# Patient Record
Sex: MALE | Race: BLACK OR AFRICAN AMERICAN | HISPANIC OR LATINO | URBAN - METROPOLITAN AREA
[De-identification: names, ages, dates, MRNs, and addresses within clinical notes are randomized per-mention and may not be internally consistent; named-entity substitution may affect disease eponyms.]

---

## 2019-11-19 ENCOUNTER — EMERGENCY (EMERGENCY)
Facility: HOSPITAL | Age: 24
LOS: 1 days | Discharge: HOME | End: 2019-11-19
Attending: EMERGENCY MEDICINE
Payer: COMMERCIAL

## 2019-11-19 VITALS
OXYGEN SATURATION: 100 % | SYSTOLIC BLOOD PRESSURE: 115 MMHG | DIASTOLIC BLOOD PRESSURE: 77 MMHG | TEMPERATURE: 97 F | HEART RATE: 85 BPM | RESPIRATION RATE: 16 BRPM

## 2019-11-19 VITALS
HEIGHT: 67 IN | TEMPERATURE: 98 F | RESPIRATION RATE: 18 BRPM | OXYGEN SATURATION: 99 % | DIASTOLIC BLOOD PRESSURE: 76 MMHG | SYSTOLIC BLOOD PRESSURE: 161 MMHG | HEART RATE: 75 BPM | WEIGHT: 110.01 LBS

## 2019-11-19 PROCEDURE — 99284 EMERGENCY DEPT VISIT MOD MDM: CPT

## 2019-11-19 PROCEDURE — 99283 EMERGENCY DEPT VISIT LOW MDM: CPT

## 2019-11-19 RX ORDER — IBUPROFEN 200 MG
600 TABLET ORAL ONCE
Refills: 0 | Status: COMPLETED | OUTPATIENT
Start: 2019-11-19 | End: 2019-11-19

## 2019-11-19 RX ORDER — CEPHALEXIN 500 MG
1 CAPSULE ORAL
Qty: 28 | Refills: 0
Start: 2019-11-19 | End: 2019-11-25

## 2019-11-19 RX ORDER — CEPHALEXIN 500 MG
500 CAPSULE ORAL ONCE
Refills: 0 | Status: COMPLETED | OUTPATIENT
Start: 2019-11-19 | End: 2019-11-19

## 2019-11-19 RX ORDER — IBUPROFEN 200 MG
1 TABLET ORAL
Qty: 20 | Refills: 0
Start: 2019-11-19 | End: 2019-11-23

## 2019-11-19 RX ADMIN — Medication 500 MILLIGRAM(S): at 13:04

## 2019-11-19 RX ADMIN — Medication 600 MILLIGRAM(S): at 13:04

## 2019-11-19 NOTE — ED PROVIDER NOTE - ATTENDING CONTRIBUTION TO CARE
Sarmiento: pt with lump on right arm.  appears superficial cellulitis.  indurated and ttp.  no crepitus, no pain distal    a/p: superficial cellulitis.  will give abx and monitor sx.

## 2019-11-19 NOTE — ED PROVIDER NOTE - CLINICAL SUMMARY MEDICAL DECISION MAKING FREE TEXT BOX
23 y/o M with a small pimple and surrounding cellulitis. No evidence of abscess, Rx keflex and strict return precautions. 25 y/o M with a small pimple and surrounding cellulitis. No evidence of abscess, Rx keflex and strict return precautions. PMD follow up in 2-3 days.

## 2019-11-19 NOTE — ED ADULT NURSE NOTE - OBJECTIVE STATEMENT
States he has a bump to right forearm , started like a pimple since yesterday , got worse , tried to poke area x2 unsuccessful . Right mid forearm with area of 0.5cm pustule and surrounding erythema 4cm x3 cm with mild induration

## 2019-11-19 NOTE — ED PROVIDER NOTE - OBJECTIVE STATEMENT
23 y/o M patient w/ no significant PMHx and no significant PSHx presents to the ED with right arm pain since yesterday. Patient reports a gradual onset of mid forearm pustule with pain and swelling with a burning sensation. Patient states he tried to poke at it twice with no success. Patient denies fever, chills, and any other complaints. Patient recent travel to [facility] areas or [facility], [facility], or any injuries. NKDA. 25 y/o M patient w/ no significant PMHx and no significant PSHx presents to the ED with right arm pain since yesterday. Patient reports a gradual onset of mid forearm pimple with pain and swelling with a burning sensation. Patient states he tried to poke at it twice with no success. Patient denies fever, chills, and any other complaints. Patient recent travel to [facility] areas or [facility], [facility], or any injuries. NKDA.

## 2019-11-19 NOTE — ED ADULT TRIAGE NOTE - ESI TRIAGE ACUITY LEVEL, MLM
Dr. Roland called and gave TORB to discontinue ibuprofen and order Toradol IV 30 mg q8 start now,  Order implemented and will continue to monitor   4

## 2020-04-09 ENCOUNTER — EMERGENCY (EMERGENCY)
Facility: HOSPITAL | Age: 25
LOS: 1 days | Discharge: HOME | End: 2020-04-09
Payer: COMMERCIAL

## 2020-04-09 VITALS
WEIGHT: 151.02 LBS | OXYGEN SATURATION: 99 % | HEART RATE: 76 BPM | SYSTOLIC BLOOD PRESSURE: 129 MMHG | HEIGHT: 67 IN | TEMPERATURE: 98 F | DIASTOLIC BLOOD PRESSURE: 82 MMHG | RESPIRATION RATE: 18 BRPM

## 2020-04-09 PROCEDURE — 87635 SARS-COV-2 COVID-19 AMP PRB: CPT

## 2020-04-09 PROCEDURE — 94640 AIRWAY INHALATION TREATMENT: CPT

## 2020-04-09 PROCEDURE — 99283 EMERGENCY DEPT VISIT LOW MDM: CPT

## 2020-04-09 RX ORDER — ALBUTEROL 90 UG/1
1 AEROSOL, METERED ORAL ONCE
Refills: 0 | Status: COMPLETED | OUTPATIENT
Start: 2020-04-09 | End: 2020-04-09

## 2020-04-09 RX ADMIN — ALBUTEROL 1 PUFF(S): 90 AEROSOL, METERED ORAL at 11:53

## 2020-04-09 NOTE — ED PROVIDER NOTE - PROGRESS NOTE DETAILS
Will accept text results, Pt is well appearing walking with steady gait, stable for discharge and follow up without fail with medical doctor. I had a detailed discussion with the patient and/or guardian regarding the historical points, exam findings, and any diagnostic results supporting the discharge diagnosis. Pt educated on care and need for follow up. Strict return instructions and red flag signs and symptoms discussed with patient. Questions answered. Pt shows understanding of discharge information and agrees to follow.

## 2020-04-09 NOTE — ED PROVIDER NOTE - CLINICAL SUMMARY MEDICAL DECISION MAKING FREE TEXT BOX
Based on exam and history will swab for corona virus, advise to continue using MDI as needed, and Tylenol for fever.

## 2020-04-09 NOTE — ED PROVIDER NOTE - NSFOLLOWUPINSTRUCTIONS_ED_ALL_ED_FT
You likely have Coronavirus.    COVID-19 testing are currently being prioritized at [facility] for admitted patients.     All patients that are stable are being discharged from the ED, even if there is a concern for coronavirus. Since you are stable, you are being discharged. Your test results may take 5-7 days. You will get a text message or email with results. Please check the patient online portal for results. Please follow the instructions on provided coronavirus discharge educational forms and self quarantine for 14 days.     In addition, you have been placed on our surveillance tracker. Return to the ED immediately if you have shortness of breath, fever, pain, weakness, vomiting any concerns.    1. STAY HOME for 14 DAYS  2. Minimize Human contact to ONLY ESSENTIAL  3. Every time you wash your hands, sing the HAPPY BIRTHDAY Song so you know you're washing long enough.  Make sure to scrub the webspace between your fingers.  4. DRINK 1-3 Liters of fluids day x at least 5 days.  To remain hydrated. Your fatigue, lightheadedness, and body aches will decrease and your fever has a better chance of breaking if you are well hydrated.    5. For your Fever and Body aches takes Tylenol 650-100mg every 4-6h (max 4000mg/day). Try not to use ibuprofen, aspirin or naproxen (Advil, Motrin or Aleve) as these may worsen Coronavirus infection.  6. Use an inhaler for mild shortness of breath and cough  7. Take a double or triple dose of Vitamin C (7200-8263 mg) per day spread out over the day .  8. RETURN TO THE ER IMMEDIATELY IF YOU HAVE WORSENING SHORTNESS OF BREATH. SYMPTOMS USUALLY PEAK BETWEEN DAY 7-10.

## 2020-04-09 NOTE — ED PROVIDER NOTE - OBJECTIVE STATEMENT
26 yo male with PMHx of asthma, working at [employer], presenting to ED with complaints of SOB, cough, and fever while at work the last 3 days. Patient fears he was exposed to Corona Virus.

## 2020-04-09 NOTE — ED PROVIDER NOTE - PATIENT PORTAL LINK FT
You can access the FollowMyHealth Patient Portal offered by [facility] by registering at the following website: [email or web address]. By joining [employer]’s FollowMyHealth portal, you will also be able to view your health information using other applications (apps) compatible with our system.

## 2020-04-10 LAB — SARS-COV-2 RNA SPEC QL NAA+PROBE: DETECTED

## 2020-06-23 NOTE — ED PROVIDER NOTE - PATIENT PORTAL LINK FT
You can access the FollowMyHealth Patient Portal offered by [facility] by registering at the following website: [email or web address]. By joining [employer]’s FollowMyHealth portal, you will also be able to view your health information using other applications (apps) compatible with our system. Debridement Text: The wound edges were debrided prior to proceeding with the closure to facilitate wound healing.

## 2020-11-30 ENCOUNTER — EMERGENCY (EMERGENCY)
Facility: HOSPITAL | Age: 25
LOS: 1 days | Discharge: HOME | End: 2020-11-30
Admitting: EMERGENCY MEDICINE
Payer: COMMERCIAL

## 2020-11-30 ENCOUNTER — NON-APPOINTMENT (OUTPATIENT)
Age: 25
End: 2020-11-30

## 2020-11-30 ENCOUNTER — APPOINTMENT (OUTPATIENT)
Dept: OPHTHALMOLOGY | Facility: CLINIC | Age: 25
End: 2020-11-30
Payer: COMMERCIAL

## 2020-11-30 VITALS
SYSTOLIC BLOOD PRESSURE: 132 MMHG | TEMPERATURE: 98 F | RESPIRATION RATE: 16 BRPM | OXYGEN SATURATION: 98 % | DIASTOLIC BLOOD PRESSURE: 82 MMHG | HEIGHT: 67 IN | HEART RATE: 65 BPM | WEIGHT: 115.08 LBS
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
## 2020-11-30 DIAGNOSIS — H57.12 OCULAR PAIN, LEFT EYE: ICD-10-CM
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
## 2020-11-30 DIAGNOSIS — S05.12XA CONTUSION OF EYEBALL AND ORBITAL TISSUES, LEFT EYE, INITIAL ENCOUNTER: ICD-10-CM

## 2020-11-30 PROBLEM — Z00.00 ENCOUNTER FOR PREVENTIVE HEALTH EXAMINATION: Status: ACTIVE | Noted: 2020-11-30

## 2020-11-30 PROBLEM — J45.20 MILD INTERMITTENT ASTHMA, UNCOMPLICATED: Chronic | Status: ACTIVE | Noted: 2020-04-09

## 2020-11-30 PROCEDURE — 99284 EMERGENCY DEPT VISIT MOD MDM: CPT

## 2020-11-30 PROCEDURE — 92004 COMPRE OPH EXAM NEW PT 1/>: CPT

## 2020-11-30 PROCEDURE — 99072 ADDL SUPL MATRL&STAF TM PHE: CPT

## 2020-11-30 PROCEDURE — 70480 CT ORBIT/EAR/FOSSA W/O DYE: CPT | Mod: 26

## 2020-11-30 PROCEDURE — 99284 EMERGENCY DEPT VISIT MOD MDM: CPT | Mod: 25

## 2020-11-30 PROCEDURE — 70480 CT ORBIT/EAR/FOSSA W/O DYE: CPT

## 2020-11-30 NOTE — ED PROVIDER NOTE - PATIENT PORTAL LINK FT
You can access the FollowMyHealth Patient Portal offered by [facility] by registering at the following website: [email or web address]. By joining [employer]’s FollowMyHealth portal, you will also be able to view your health information using other applications (apps) compatible with our system.

## 2020-11-30 NOTE — ED PROVIDER NOTE - OBJECTIVE STATEMENT
states that on Saturday he was hit on the nose/eye with a football. states that after the injury he was experiencing flashing light in the left eye until Sunday afternoon. states that he is not experiencing flashing lights at this time. states that he has new floaters in the left eye and "2/5 of my vision isn't there". does not endorse head injury numbness tingling weakness, no change in hearing. states pain to the side of his left nose. was seen and evaluated by urgent care and sent to ED for further evaluation.

## 2020-11-30 NOTE — ED PROVIDER NOTE - NSFOLLOWUPINSTRUCTIONS_ED_ALL_ED_FT
please go straight to [facility] for appointment at 2pm with Dr. Huerta (6th floor).            BLURRED VISION - AfterCare(R) Instructions(ER/ED)           Blurred Vision    WHAT YOU NEED TO KNOW:    Blurred vision is when you cannot see fine details. You may have blurred vision if you are nearsighted or farsighted and you need glasses. Blurred vision may be caused by a corneal abrasion (scratch on the cornea) or a corneal ulcer (open sore). You may have blurred vision if your eye came into contact with a chemical. A foreign body or infection may also cause blurred vision. Medical conditions, such as cataracts, glaucoma, detached retina, and nerve disorders can also cause blurred vision. Blurred vision may also be caused by a concussion or a tumor. If you have diabetes, you may develop diabetic retinopathy. Diabetic retinopathy damages the blood vessels of your retina.     Eye Anatomy         DISCHARGE INSTRUCTIONS:    Return to the emergency department if:   •You have weakness in an arm or leg, difficulty speaking or seeing, and a severe headache.      •You have a fever, eye pain, or discharge.       •You have a sudden loss of vision.       Contact your healthcare provider if:   •Your blurred vision gets worse.      •Your blurred vision is worse in the morning.      •You have a sudden headache or eye pain.      •Your eye has swelling, redness, or discharge.      •You see floaters, flashes of light, fine dots, or cobweb shapes.      •You have questions or concerns about your condition or care.       Medicines: You may need any of the following:   •Prescription pain medicine may be given. Ask how to take this medicine safely.      •Antibiotics help prevent or treat an eye infection caused by bacteria. It may be given as eyedrops or an ointment.      •Take your medicine as directed. Contact your healthcare provider if you think your medicine is not helping or if you have side effects. Tell him of her if you are allergic to any medicine. Keep a list of the medicines, vitamins, and herbs you take. Include the amounts, and when and why you take them. Bring the list or the pill bottles to follow-up visits. Carry your medicine list with you in case of an emergency.      Manage your blurred vision: Your healthcare provider may ask you to do any of the following:  •Use artificial tears to keep your eye moist or to soothe your irritated eye.      •Apply a cool compress to decrease any swelling or pain. Wet a clean washcloth with cool water and place it on your eye. Use the cool compress as often as directed.       •Wear an eye patch as directed to protect your eye.  Eye Patch           Follow up with your healthcare provider as directed: You may need other eye exams and medicines. Write down your questions so you remember to ask them during your visits.       © Copyright [email or web address] 2020           back to top                          © Copyright [email or web address] 2020

## 2020-11-30 NOTE — ED PROVIDER NOTE - NSFOLLOWUPCLINICS_GEN_ALL_ED_FT
[facility]  Ophthalmology  [address] [place] [address]  Phone: [phone]  Fax:   Follow Up Time:

## 2020-11-30 NOTE — ED ADULT TRIAGE NOTE - CHIEF COMPLAINT QUOTE
Pt c/o pain and swelling to left eye s/p football hitting pt in the eye 3 days ago. Pt reports difficulty opening eye secondary to pain. Pt was at  and sent to ED for further evaluation. Denies headache, neck pain.

## 2020-11-30 NOTE — ED ADULT NURSE NOTE - OBJECTIVE STATEMENT
Pt is a 24 y/o male A&Ox4 ambulatory with steady gait sent in by  c/o left eye pain. Pt reports being hit with a football 3 days ago. Pt denies neck pain, h/a. Pt talking in clear, full sentences, respirations even and unlabored, neuro intact. Pt is a 24 y/o male A&Ox4 ambulatory with steady gait sent in by  c/o left eye pain. Pt reports being hit with a football 3 days ago to left eye and nose s/p pt experienced "flashing lights in left eye," resolved today but endorses "floaters in left eye." Pt denies neck pain, h/a, numbness/tingling, N/V, LOC, fall. Pt talking in clear, full sentences, respirations even and unlabored, neuro intact. Upon assessment edema noted to orbital area. Pt is a 24 y/o male A&Ox4 ambulatory with steady gait sent in by  c/o left eye pain. Pt reports being hit with a football 3 days ago to left eye and nose s/p pt experienced "flashing lights in left eye," resolved Saturday but endorses "floaters in left eye today." Pt denies neck pain, h/a, numbness/tingling, N/V, LOC, fall. Pt talking in clear, full sentences, respirations even and unlabored, neuro intact. Upon assessment edema and ecchymosis noted to left orbital area.

## 2020-11-30 NOTE — ED PROVIDER NOTE - PHYSICAL EXAMINATION
General: Patient is well developed and well nourished. Patient is alert and oriented to person, place and date. Patient is sitting stretcher and appears in no acute distress.  HEENT: left infraorbital ecchymosis, no ttp nasal bridge or orbit. left pupil appears mishapen. Head is normocephalic and atraumatic. Pupils are equal, round and reactive. Extraocular movements intact. No evidence of nystagmus, conjunctival injection, or scleral icterus. External ears symmetric without evidence of discharge.  Nose is symmetric, non-tender, patent without evidence of discharge. Teeth in good repair. Uvula midline.   Lungs: . Equal chest expansion. No note of retractions.  Skin: Warm, dry and intact without evidence of rashes, bruising, pallor, jaundice or cyanosis.   Psych: Mood and affect appropriate.

## 2020-11-30 NOTE — ED PROVIDER NOTE - CLINICAL SUMMARY MEDICAL DECISION MAKING FREE TEXT BOX
states that on Saturday he was hit on the nose/eye with a football. states that after the injury he was experiencing flashing light in the left eye until Sunday afternoon. states that he is not experiencing flashing lights at this time. states that he has new floaters in the left eye and "2/5 of my vision isn't there". does not endorse head injury numbness tingling weakness, no change in hearing. states pain to the side of his left nose. was seen and evaluated by urgent care and sent to ED for further evaluation. on exam pt appears well non-toxic. left infraorbital ecchymosis. left pupil appears slightly mishapen, reactive to light. plan for ct r/o fracture and opthalmology consult. states that on Saturday he was hit on the nose/eye with a football. states that after the injury he was experiencing flashing light in the left eye until Sunday afternoon. states that he is not experiencing flashing lights at this time. states that he has new floaters in the left eye and "2/5 of my vision isn't there". does not endorse head injury numbness tingling weakness, no change in hearing. states pain to the side of his left nose. was seen and evaluated by urgent care and sent to ED for further evaluation. on exam pt appears well non-toxic. left infraorbital ecchymosis. left pupil appears slightly mishapen, reactive to light. plan for ct r/o fracture and opthalmology consult. no fracture plan to dc to have patient seen at [facility]- spoke who optho

## 2020-12-01 ENCOUNTER — NON-APPOINTMENT (OUTPATIENT)
Age: 25
End: 2020-12-01

## 2020-12-01 ENCOUNTER — APPOINTMENT (OUTPATIENT)
Dept: OPHTHALMOLOGY | Facility: CLINIC | Age: 25
End: 2020-12-01
Payer: COMMERCIAL

## 2020-12-01 PROCEDURE — 92250 FUNDUS PHOTOGRAPHY W/I&R: CPT

## 2020-12-01 PROCEDURE — 92014 COMPRE OPH EXAM EST PT 1/>: CPT

## 2020-12-01 PROCEDURE — 99072 ADDL SUPL MATRL&STAF TM PHE: CPT

## 2020-12-08 ENCOUNTER — NON-APPOINTMENT (OUTPATIENT)
Age: 25
End: 2020-12-08

## 2020-12-08 ENCOUNTER — APPOINTMENT (OUTPATIENT)
Dept: OPHTHALMOLOGY | Facility: CLINIC | Age: 25
End: 2020-12-08
Payer: SELF-PAY

## 2020-12-08 PROCEDURE — 92012 INTRM OPH EXAM EST PATIENT: CPT

## 2020-12-18 ENCOUNTER — NON-APPOINTMENT (OUTPATIENT)
Age: 25
End: 2020-12-18

## 2020-12-18 ENCOUNTER — APPOINTMENT (OUTPATIENT)
Dept: OPHTHALMOLOGY | Facility: CLINIC | Age: 25
End: 2020-12-18
Payer: SELF-PAY

## 2020-12-18 PROCEDURE — 99072 ADDL SUPL MATRL&STAF TM PHE: CPT

## 2020-12-18 PROCEDURE — 92012 INTRM OPH EXAM EST PATIENT: CPT

## 2020-12-18 PROCEDURE — 92134 CPTRZ OPH DX IMG PST SGM RTA: CPT

## 2021-01-06 ENCOUNTER — APPOINTMENT (OUTPATIENT)
Dept: OPHTHALMOLOGY | Facility: CLINIC | Age: 26
End: 2021-01-06
Payer: SELF-PAY

## 2021-01-06 ENCOUNTER — NON-APPOINTMENT (OUTPATIENT)
Age: 26
End: 2021-01-06

## 2021-01-06 PROCEDURE — 99072 ADDL SUPL MATRL&STAF TM PHE: CPT

## 2021-01-06 PROCEDURE — 92012 INTRM OPH EXAM EST PATIENT: CPT

## 2021-01-06 PROCEDURE — 92134 CPTRZ OPH DX IMG PST SGM RTA: CPT

## 2021-02-03 ENCOUNTER — APPOINTMENT (OUTPATIENT)
Dept: OPHTHALMOLOGY | Facility: CLINIC | Age: 26
End: 2021-02-03

## 2022-01-08 ENCOUNTER — EMERGENCY (EMERGENCY)
Facility: HOSPITAL | Age: 27
LOS: 1 days | Discharge: HOME | End: 2022-01-08
Attending: EMERGENCY MEDICINE
Payer: COMMERCIAL

## 2022-01-08 VITALS
HEIGHT: 67 IN | OXYGEN SATURATION: 98 % | RESPIRATION RATE: 16 BRPM | TEMPERATURE: 98 F | DIASTOLIC BLOOD PRESSURE: 90 MMHG | SYSTOLIC BLOOD PRESSURE: 130 MMHG | WEIGHT: 115.08 LBS | HEART RATE: 98 BPM

## 2022-01-08 VITALS
RESPIRATION RATE: 20 BRPM | HEART RATE: 71 BPM | SYSTOLIC BLOOD PRESSURE: 128 MMHG | TEMPERATURE: 99 F | DIASTOLIC BLOOD PRESSURE: 77 MMHG | OXYGEN SATURATION: 100 %

## 2022-01-08 LAB
ALBUMIN SERPL ELPH-MCNC: 4 G/DL — SIGNIFICANT CHANGE UP (ref 3.5–5)
ALP SERPL-CCNC: 80 U/L — SIGNIFICANT CHANGE UP (ref 40–120)
ALT FLD-CCNC: 15 U/L DA — SIGNIFICANT CHANGE UP (ref 10–60)
ANION GAP SERPL CALC-SCNC: 5 MMOL/L — SIGNIFICANT CHANGE UP (ref 5–17)
AST SERPL-CCNC: 11 U/L — SIGNIFICANT CHANGE UP (ref 10–40)
BASOPHILS # BLD AUTO: 0.01 K/UL — SIGNIFICANT CHANGE UP (ref 0–0.2)
BASOPHILS NFR BLD AUTO: 0.1 % — SIGNIFICANT CHANGE UP (ref 0–2)
BILIRUB SERPL-MCNC: 1 MG/DL — SIGNIFICANT CHANGE UP (ref 0.2–1.2)
BUN SERPL-MCNC: 10 MG/DL — SIGNIFICANT CHANGE UP (ref 7–18)
CALCIUM SERPL-MCNC: 9.5 MG/DL — SIGNIFICANT CHANGE UP (ref 8.4–10.5)
CHLORIDE SERPL-SCNC: 104 MMOL/L — SIGNIFICANT CHANGE UP (ref 96–108)
CO2 SERPL-SCNC: 28 MMOL/L — SIGNIFICANT CHANGE UP (ref 22–31)
CREAT SERPL-MCNC: 1.04 MG/DL — SIGNIFICANT CHANGE UP (ref 0.5–1.3)
EOSINOPHIL # BLD AUTO: 0 K/UL — SIGNIFICANT CHANGE UP (ref 0–0.5)
EOSINOPHIL NFR BLD AUTO: 0 % — SIGNIFICANT CHANGE UP (ref 0–6)
GLUCOSE SERPL-MCNC: 111 MG/DL — HIGH (ref 70–99)
HCT VFR BLD CALC: 47.3 % — SIGNIFICANT CHANGE UP (ref 39–50)
HGB BLD-MCNC: 15.6 G/DL — SIGNIFICANT CHANGE UP (ref 13–17)
IMM GRANULOCYTES NFR BLD AUTO: 0.3 % — SIGNIFICANT CHANGE UP (ref 0–1.5)
LIDOCAIN IGE QN: 3047 U/L — HIGH (ref 73–393)
LYMPHOCYTES # BLD AUTO: 0.71 K/UL — LOW (ref 1–3.3)
LYMPHOCYTES # BLD AUTO: 9 % — LOW (ref 13–44)
MCHC RBC-ENTMCNC: 27.9 PG — SIGNIFICANT CHANGE UP (ref 27–34)
MCHC RBC-ENTMCNC: 33 GM/DL — SIGNIFICANT CHANGE UP (ref 32–36)
MCV RBC AUTO: 84.6 FL — SIGNIFICANT CHANGE UP (ref 80–100)
MONOCYTES # BLD AUTO: 0.58 K/UL — SIGNIFICANT CHANGE UP (ref 0–0.9)
MONOCYTES NFR BLD AUTO: 7.4 % — SIGNIFICANT CHANGE UP (ref 2–14)
NEUTROPHILS # BLD AUTO: 6.56 K/UL — SIGNIFICANT CHANGE UP (ref 1.8–7.4)
NEUTROPHILS NFR BLD AUTO: 83.2 % — HIGH (ref 43–77)
NRBC # BLD: 0 /100 WBCS — SIGNIFICANT CHANGE UP (ref 0–0)
PLATELET # BLD AUTO: 279 K/UL — SIGNIFICANT CHANGE UP (ref 150–400)
POTASSIUM SERPL-MCNC: 4.4 MMOL/L — SIGNIFICANT CHANGE UP (ref 3.5–5.3)
POTASSIUM SERPL-SCNC: 4.4 MMOL/L — SIGNIFICANT CHANGE UP (ref 3.5–5.3)
PROT SERPL-MCNC: 7.8 G/DL — SIGNIFICANT CHANGE UP (ref 6–8.3)
RBC # BLD: 5.59 M/UL — SIGNIFICANT CHANGE UP (ref 4.2–5.8)
RBC # FLD: 12.6 % — SIGNIFICANT CHANGE UP (ref 10.3–14.5)
SARS-COV-2 RNA SPEC QL NAA+PROBE: SIGNIFICANT CHANGE UP
SODIUM SERPL-SCNC: 137 MMOL/L — SIGNIFICANT CHANGE UP (ref 135–145)
WBC # BLD: 7.88 K/UL — SIGNIFICANT CHANGE UP (ref 3.8–10.5)
WBC # FLD AUTO: 7.88 K/UL — SIGNIFICANT CHANGE UP (ref 3.8–10.5)

## 2022-01-08 PROCEDURE — 96374 THER/PROPH/DIAG INJ IV PUSH: CPT | Mod: XU

## 2022-01-08 PROCEDURE — 96375 TX/PRO/DX INJ NEW DRUG ADDON: CPT

## 2022-01-08 PROCEDURE — 85025 COMPLETE CBC W/AUTO DIFF WBC: CPT

## 2022-01-08 PROCEDURE — 87635 SARS-COV-2 COVID-19 AMP PRB: CPT

## 2022-01-08 PROCEDURE — 76705 ECHO EXAM OF ABDOMEN: CPT

## 2022-01-08 PROCEDURE — 36415 COLL VENOUS BLD VENIPUNCTURE: CPT

## 2022-01-08 PROCEDURE — 80053 COMPREHEN METABOLIC PANEL: CPT

## 2022-01-08 PROCEDURE — 99285 EMERGENCY DEPT VISIT HI MDM: CPT

## 2022-01-08 PROCEDURE — 83690 ASSAY OF LIPASE: CPT

## 2022-01-08 PROCEDURE — 99284 EMERGENCY DEPT VISIT MOD MDM: CPT | Mod: 25

## 2022-01-08 PROCEDURE — 76705 ECHO EXAM OF ABDOMEN: CPT | Mod: 26

## 2022-01-08 RX ORDER — KETOROLAC TROMETHAMINE 30 MG/ML
30 SYRINGE (ML) INJECTION ONCE
Refills: 0 | Status: DISCONTINUED | OUTPATIENT
Start: 2022-01-08 | End: 2022-01-08

## 2022-01-08 RX ORDER — ONDANSETRON 8 MG/1
4 TABLET, FILM COATED ORAL ONCE
Refills: 0 | Status: COMPLETED | OUTPATIENT
Start: 2022-01-08 | End: 2022-01-08

## 2022-01-08 RX ORDER — SODIUM CHLORIDE 9 MG/ML
1000 INJECTION INTRAMUSCULAR; INTRAVENOUS; SUBCUTANEOUS ONCE
Refills: 0 | Status: COMPLETED | OUTPATIENT
Start: 2022-01-08 | End: 2022-01-08

## 2022-01-08 RX ADMIN — Medication 30 MILLIGRAM(S): at 20:26

## 2022-01-08 RX ADMIN — Medication 30 MILLIGRAM(S): at 19:43

## 2022-01-08 RX ADMIN — ONDANSETRON 4 MILLIGRAM(S): 8 TABLET, FILM COATED ORAL at 18:51

## 2022-01-08 RX ADMIN — SODIUM CHLORIDE 1000 MILLILITER(S): 9 INJECTION INTRAMUSCULAR; INTRAVENOUS; SUBCUTANEOUS at 18:50

## 2022-01-08 NOTE — ED ADULT NURSE NOTE - NS ED NURSE LEVEL OF CONSCIOUSNESS AFFECT
Medication: amLODIPine (NORVASC)  Dosage: 10 MG tablet  Sig: Take 1 tablet by mouth daily  Qty: 30  Last Refill: 8-14-20  Last Office Visit for this diagnosis: 11-7-19  Next Appt:  none  Pharmacy Verified:  YES  Patient informed that refill may take up to 48 hours:   NO    Spoke with pt and stated a Follow-Up Appt is needed. Pt stated he'd call back tomorrow to arrange.   Calm

## 2022-01-08 NOTE — ED PROVIDER NOTE - NSICDXNOPASTSURGICALHX_GEN_ALL_ED
Received phone call on direct line from nurse Murrell from pt's [facility] agency [facility]. She requests prescription and clinic note be sent to [phone].     Rx printed. Will have WTZ sign tomorrow.    <-- Click to add NO significant Past Surgical History

## 2022-01-08 NOTE — ED PROVIDER NOTE - PROGRESS NOTE DETAILS
Patient lab sig for pancreatitis. tolerated po. denies alcohol use. pain resolved. well apearing. us negative. patient states he feels comfortable to go home and f.u gi. given return precaution, gi f.u info and instructed to f.u pmd

## 2022-01-08 NOTE — ED PROVIDER NOTE - PATIENT PORTAL LINK FT
You can access the FollowMyHealth Patient Portal offered by [facility] by registering at the following website: [email or web address]. By joining [employer]’s FollowMyHealth portal, you will also be able to view your health information using other applications (apps) compatible with our system.

## 2022-01-08 NOTE — ED ADULT NURSE NOTE - OBJECTIVE STATEMENT
pt is here for abdominal pain.  pt stated that abdominal pain, N/V since in the morning, denied chest pain or sob, denied N/V/D,

## 2022-01-08 NOTE — ED ADULT TRIAGE NOTE - NS ED NURSE BANDS TYPE
Condition:: Cosmetic Consult
Please Describe Your Condition:: Lines under the eyes and around mouth.
Name band;

## 2022-01-08 NOTE — ED PROVIDER NOTE - OBJECTIVE STATEMENT
Patient reports he ate dinner around 9pm. Woke up around 2am with nausea. Vomited x2, NBNB. Has had epigastric pain since. Took an unknown laxative, pepto-bismal, and pepcid, then had 3 loose stools. No fever, cp, sob, cough.

## 2022-02-22 ENCOUNTER — APPOINTMENT (OUTPATIENT)
Age: 27
End: 2022-02-22
Payer: COMMERCIAL

## 2022-02-22 VITALS
DIASTOLIC BLOOD PRESSURE: 73 MMHG | HEART RATE: 56 BPM | SYSTOLIC BLOOD PRESSURE: 115 MMHG | TEMPERATURE: 97.8 F | WEIGHT: 114 LBS | BODY MASS INDEX: 16.88 KG/M2 | HEIGHT: 69 IN

## 2022-02-22 DIAGNOSIS — K85.90 ACUTE PANCREATITIS WITHOUT NECROSIS OR INFECTION, UNSPECIFIED: ICD-10-CM

## 2022-02-22 PROCEDURE — 99203 OFFICE O/P NEW LOW 30 MIN: CPT

## 2022-02-24 LAB
CHOLEST SERPL-MCNC: 161 MG/DL
ESTIMATED AVERAGE GLUCOSE: 120 MG/DL
HBA1C MFR BLD HPLC: 5.8 %
HDLC SERPL-MCNC: 53 MG/DL
LDLC SERPL CALC-MCNC: 101 MG/DL
LPL SERPL-CCNC: 27 U/L
NONHDLC SERPL-MCNC: 108 MG/DL
TRIGL SERPL-MCNC: 37 MG/DL

## 2022-02-27 NOTE — HISTORY OF PRESENT ILLNESS
[de-identified] : This is a 27 year old male here for post hospital discharge of acute pancreatitis. PMH of asthma, and anxiety. No family history of GI issues or cancers. Patient was in the ED on 1/8/22 with N&V and LUQ pain. As per a per patient he had dinner and went to bed. At 2AM he had sudden LUQ pain, 10/10 and started vomiting. Vomit was bilious and non bloody. He never had such pain before. Endorses being under weight since childhood. He denies any eating disorders but has stretch of days where he does not eat. The longest stretch was 3 days. Denies any food poisoning, ETOH use, drugs, recent ABX or herbal use. He is a student.  Has long family history of diabetics. \par BM 16.83\par Wt 114

## 2022-02-27 NOTE — ASSESSMENT
[FreeTextEntry1] : This is a 27 year old male here for post hospital discharge of acute pancreatitis. PMH of asthma, and anxiety. No family history of GI issues or cancers. Patient was in the ED on 1/8/22 with N&V and LUQ pain. As per a per patient he had dinner and went to bed. At 2AM he had sudden LUQ pain, 10/10 and started vomiting. Vomit was bilious and non bloody. He never had such pain before. Endorses being under weight since childhood. He denies any eating disorders but has stretch of days where he does not eat. The longest stretch was 3 days. Denies any food poisoning, ETOH use, drugs, recent ABX or herbal use. He is a student.  Has long family history of diabetics. \par BM 16.83\par Wt 114\par \par My plan\par -MCRP- to r/o divisum, PD or CBD stone\par -lipid panel, A1c, lipase \par -may need to continue with  for surveillance and or EUS for acute pancreatitis if MCRP does not show anything.

## 2022-03-09 ENCOUNTER — APPOINTMENT (OUTPATIENT)
Dept: MRI IMAGING | Facility: HOSPITAL | Age: 27
End: 2022-03-09
Payer: COMMERCIAL

## 2022-03-09 ENCOUNTER — OUTPATIENT (OUTPATIENT)
Dept: OUTPATIENT SERVICES | Facility: HOSPITAL | Age: 27
LOS: 1 days | End: 2022-03-09
Payer: COMMERCIAL

## 2022-03-09 DIAGNOSIS — K85.90 ACUTE PANCREATITIS WITHOUT NECROSIS OR INFECTION, UNSPECIFIED: ICD-10-CM

## 2022-03-09 PROCEDURE — 74183 MRI ABD W/O CNTR FLWD CNTR: CPT | Mod: 26

## 2022-03-09 PROCEDURE — A9579: CPT

## 2022-03-09 PROCEDURE — 74183 MRI ABD W/O CNTR FLWD CNTR: CPT

## 2022-03-29 ENCOUNTER — APPOINTMENT (OUTPATIENT)
Dept: GASTROENTEROLOGY | Facility: CLINIC | Age: 27
End: 2022-03-29

## 2022-08-31 ENCOUNTER — APPOINTMENT (OUTPATIENT)
Dept: NEUROLOGY | Facility: CLINIC | Age: 27
End: 2022-08-31

## 2023-01-13 ENCOUNTER — APPOINTMENT (OUTPATIENT)
Dept: OPHTHALMOLOGY | Facility: CLINIC | Age: 28
End: 2023-01-13
Payer: COMMERCIAL

## 2023-01-13 ENCOUNTER — NON-APPOINTMENT (OUTPATIENT)
Age: 28
End: 2023-01-13

## 2023-01-13 PROCEDURE — 92014 COMPRE OPH EXAM EST PT 1/>: CPT

## 2023-01-13 PROCEDURE — 92250 FUNDUS PHOTOGRAPHY W/I&R: CPT

## 2023-02-14 ENCOUNTER — NON-APPOINTMENT (OUTPATIENT)
Age: 28
End: 2023-02-14

## 2023-02-14 ENCOUNTER — APPOINTMENT (OUTPATIENT)
Dept: OPHTHALMOLOGY | Facility: CLINIC | Age: 28
End: 2023-02-14
Payer: COMMERCIAL

## 2023-02-14 PROCEDURE — 92250 FUNDUS PHOTOGRAPHY W/I&R: CPT

## 2023-02-14 PROCEDURE — 92012 INTRM OPH EXAM EST PATIENT: CPT

## 2023-03-14 ENCOUNTER — APPOINTMENT (OUTPATIENT)
Dept: OPHTHALMOLOGY | Facility: CLINIC | Age: 28
End: 2023-03-14
Payer: COMMERCIAL

## 2023-03-14 ENCOUNTER — NON-APPOINTMENT (OUTPATIENT)
Age: 28
End: 2023-03-14

## 2023-03-14 PROCEDURE — 92012 INTRM OPH EXAM EST PATIENT: CPT

## 2023-03-14 PROCEDURE — 92250 FUNDUS PHOTOGRAPHY W/I&R: CPT

## 2023-06-20 ENCOUNTER — NON-APPOINTMENT (OUTPATIENT)
Age: 28
End: 2023-06-20

## 2023-06-20 ENCOUNTER — APPOINTMENT (OUTPATIENT)
Dept: OPHTHALMOLOGY | Facility: CLINIC | Age: 28
End: 2023-06-20
Payer: COMMERCIAL

## 2023-06-20 PROCEDURE — 92134 CPTRZ OPH DX IMG PST SGM RTA: CPT

## 2023-06-20 PROCEDURE — 92012 INTRM OPH EXAM EST PATIENT: CPT

## 2023-07-25 ENCOUNTER — APPOINTMENT (OUTPATIENT)
Dept: OPHTHALMOLOGY | Facility: CLINIC | Age: 28
End: 2023-07-25

## 2023-09-26 ENCOUNTER — APPOINTMENT (OUTPATIENT)
Dept: OPHTHALMOLOGY | Facility: CLINIC | Age: 28
End: 2023-09-26

## 2023-12-26 ENCOUNTER — APPOINTMENT (OUTPATIENT)
Dept: OPHTHALMOLOGY | Facility: CLINIC | Age: 28
End: 2023-12-26